# Patient Record
(demographics unavailable — no encounter records)

---

## 2024-11-14 NOTE — PLAN
[FreeTextEntry1] : acute uri with otitis media start amoxacillin   Recommend treatment focusing on decreasing severity of symptoms including salt gargles, over the counter Cepacol lozenges as needed for sore throat, over the counter saline nasal spray as needed for congestion.  Wash hands to prevent the spread of infection, drink plenty of fluids, get appropriate rest, and maintain adequate nutrition. Follow up in 4-5 days if symptoms worsen or if symptoms persists beyond 14 days. Cough my persist for up to 3 weeks after other symptoms resolve. Advised patient to refer to emergency room if temperature 104 or greater, confusion, severe headache, neck pain, stiffness, inability to swallow, difficulty breathing, drooling, chest pain, abdominal pain, vomiting or shortness of breath. Rapid flu/covid negative will send out PCR  Type 2 diabetes recent A1C increase to 6.9 will trial mounjaro 5mg weekly injectable she will RTO 3 months for repeat labs

## 2024-11-14 NOTE — HISTORY OF PRESENT ILLNESS
[Cold Symptoms] : cold symptoms [Moderate] : moderate [___ Days ago] : [unfilled] days ago [Constant] : constant [Congestion] : congestion [Cough] : cough [Sore Throat] : sore throat [Chills] : chills [Rest] : rest [Activity] : with activity [At Night] : at night [Worsening] : worsening [Wheezing] : no wheezing [Shortness Of Breath] : no shortness of breath [Earache] : no earache [Fatigue] : not fatigue [Headache] : no headache [Fever] : no fever [FreeTextEntry8] : Patient presents with bad cough, body aches, nose is running constantly. Patient thinks it might be a sinus infection.  In addition she is requesting a prescription of mounjaro, she was recently seen by weight loss center and told that mounjaro would be a good medication for her given her diabetes. She had prior auth approval but needs to have prescription sent through her primary rather then a physician that is out of network.

## 2024-11-14 NOTE — PHYSICAL EXAM
[Normal] : no carotid or abdominal bruits heard, no varicosities, pedal pulses are present, no peripheral edema, no extremity clubbing or cyanosis and no palpable aorta [de-identified] : erythematous TM with purulent drainage noted

## 2024-11-14 NOTE — HEALTH RISK ASSESSMENT
[0] : 2) Feeling down, depressed, or hopeless: Not at all (0) [PHQ-2 Negative - No further assessment needed] : PHQ-2 Negative - No further assessment needed [[id number]] : 0 [Never] : Never

## 2024-11-19 NOTE — HISTORY OF PRESENT ILLNESS
[FreeTextEntry1] : 68-year-old female with longstanding history of prolapsing and bleeding hemorrhoids.  Symptoms have been worsening.  Underwent previous rubber band ligation procedure with good results

## 2024-11-19 NOTE — ASSESSMENT
[FreeTextEntry1] : 68-year-old female with prolapsing and bleeding internal and external hemorrhoids.  Recommend rubber band ligation procedure, after informed consent was obtained anoscopy was performed with a lighted anoscope which demonstrated multiple large grade 3 internal hemorrhoids. Rubber band ligation procedure was done to 2 internal hemorrhoids above the dentate line without any complications. Patient all procedure well. Patient was given post rubber band ligation instructions.  Recommend high fiber diet, Metamucil daily, sitz baths, stool softeners, pain medications p.r.n.

## 2024-11-19 NOTE — PHYSICAL EXAM
[Abdomen Masses] : No abdominal masses [Abdomen Tenderness] : ~T No ~M abdominal tenderness [Tender] : nontender [Manually Reducible] : a manually reducible (grade III) [Tender, Swollen] : tender, swollen [Normal] : was normal [None] : there was no rectal mass  [JVD] : no jugular venous distention  [Normal Breath Sounds] : Normal breath sounds [Normal Heart Sounds] : normal heart sounds [Normal Rate and Rhythm] : normal rate and rhythm [No Rash or Lesion] : No rash or lesion [Alert] : alert [Oriented to Person] : oriented to person [Oriented to Place] : oriented to place [Oriented to Time] : oriented to time [Calm] : calm [de-identified] : Multiple grade 3 hemorrhoids [de-identified] : Looks well in no distress, of stated age.

## 2024-12-17 NOTE — HISTORY OF PRESENT ILLNESS
[FreeTextEntry1] : 68-year-old female with longstanding history of prolapsing and bleeding hemorrhoids.  Symptoms have been worsening.  Underwent previous rubber band ligation procedure with good results.  Also reports constipation due to recent injectable medication for weight loss Mounjaro.

## 2024-12-17 NOTE — ASSESSMENT
[FreeTextEntry1] : 68-year-old female with prolapsing and bleeding internal and external hemorrhoids and constipation..  Recommend rubber band ligation procedure, after informed consent was obtained anoscopy was performed with a lighted anoscope which demonstrated multiple large grade 3 internal hemorrhoids. Rubber band ligation procedure was done to 2 internal hemorrhoids above the dentate line without any complications. Patient all procedure well. Patient was given post rubber band ligation instructions.  Recommend high fiber diet, Metamucil daily, sitz baths, stool softeners, pain medications p.r.n. recommend milk of magnesia for constipation as needed follow-up in 4 months

## 2024-12-17 NOTE — PHYSICAL EXAM
[Abdomen Masses] : No abdominal masses [Abdomen Tenderness] : ~T No ~M abdominal tenderness [Tender] : nontender [Manually Reducible] : a manually reducible (grade III) [Tender, Swollen] : tender, swollen [Normal] : was normal [None] : there was no rectal mass  [JVD] : no jugular venous distention  [Normal Breath Sounds] : Normal breath sounds [Normal Heart Sounds] : normal heart sounds [Normal Rate and Rhythm] : normal rate and rhythm [No Rash or Lesion] : No rash or lesion [Alert] : alert [Oriented to Person] : oriented to person [Oriented to Place] : oriented to place [Oriented to Time] : oriented to time [Calm] : calm [de-identified] : Multiple grade 3 hemorrhoids [de-identified] : Looks well in no distress, of stated age.

## 2025-05-13 NOTE — HEALTH RISK ASSESSMENT
[0] : 2) Feeling down, depressed, or hopeless: Not at all (0) [PHQ-2 Negative - No further assessment needed] : PHQ-2 Negative - No further assessment needed [[id number]] : 0 [Never] : Never

## 2025-05-13 NOTE — PLAN
[FreeTextEntry1] : Obesity okay to trial contrave she will take 1 tablet daily RTO in 1 month  DM II continue current medications repeat A1C  HLD continue with statin repeat lipids  HTN well controlled continue with medications

## 2025-05-13 NOTE — HISTORY OF PRESENT ILLNESS
[FreeTextEntry1] : patient presents for follow-up to discuss about alternative medication for her Metformin  [de-identified] : 69 year old female presenting in office requesting renewal of medications. She has history of HTN, HLD, DMII, and feeling well. She obtained a denial from insurance company for metformin XR. In addition she would like to try an alternative medication for weight loss, she could not tolerate GLP 1 medications as they made her extremely constipated with side effects of nausea and feeling unwell.

## 2025-05-13 NOTE — HISTORY OF PRESENT ILLNESS
[FreeTextEntry1] : patient presents for follow-up to discuss about alternative medication for her Metformin  [de-identified] : 69 year old female presenting in office requesting renewal of medications. She has history of HTN, HLD, DMII, and feeling well. She obtained a denial from insurance company for metformin XR. In addition she would like to try an alternative medication for weight loss, she could not tolerate GLP 1 medications as they made her extremely constipated with side effects of nausea and feeling unwell.

## 2025-05-13 NOTE — HEALTH RISK ASSESSMENT
[0] : 2) Feeling down, depressed, or hopeless: Not at all (0) [PHQ-2 Negative - No further assessment needed] : PHQ-2 Negative - No further assessment needed [[id number]] : 0 [Never] : Never